# Patient Record
Sex: FEMALE | Race: WHITE | NOT HISPANIC OR LATINO | Employment: UNEMPLOYED | URBAN - METROPOLITAN AREA
[De-identification: names, ages, dates, MRNs, and addresses within clinical notes are randomized per-mention and may not be internally consistent; named-entity substitution may affect disease eponyms.]

---

## 2017-01-03 ENCOUNTER — OFFICE VISIT (OUTPATIENT)
Dept: OBSTETRICS AND GYNECOLOGY | Facility: CLINIC | Age: 27
End: 2017-01-03
Payer: MEDICAID

## 2017-01-03 VITALS
BODY MASS INDEX: 30.47 KG/M2 | SYSTOLIC BLOOD PRESSURE: 118 MMHG | HEIGHT: 66 IN | DIASTOLIC BLOOD PRESSURE: 82 MMHG | WEIGHT: 189.63 LBS

## 2017-01-03 DIAGNOSIS — R87.613 HGSIL (HIGH GRADE SQUAMOUS INTRAEPITHELIAL LESION) ON PAP SMEAR OF CERVIX: Primary | ICD-10-CM

## 2017-01-03 PROCEDURE — 99203 OFFICE O/P NEW LOW 30 MIN: CPT | Mod: PBBFAC,PO | Performed by: OBSTETRICS & GYNECOLOGY

## 2017-01-03 PROCEDURE — 99203 OFFICE O/P NEW LOW 30 MIN: CPT | Mod: S$PBB,,, | Performed by: OBSTETRICS & GYNECOLOGY

## 2017-01-03 PROCEDURE — 99999 PR PBB SHADOW E&M-NEW PATIENT-LVL III: CPT | Mod: PBBFAC,,, | Performed by: OBSTETRICS & GYNECOLOGY

## 2017-01-03 NOTE — MR AVS SNAPSHOT
"    [facility] - OB/ GYN  [address]  [place] [address]  Phone: [phone]  Fax: [phone]                  Deb Saldana   1/3/2017 1:30 PM   Office Visit    Description:  Female : 1990   Provider:  William Crews MD   Department:  [facility] - OB/ GYN           Reason for Visit     Abnormal Pap Smear           Diagnoses this Visit        Comments    HGSIL (high grade squamous intraepithelial lesion) on Pap smear of cervix    -  Primary            To Do List           Future Appointments        Provider Department Dept Phone    2017 9:30 AM William Crews MD [facility] OB/ -[phone]      Goals (5 Years of Data)     None      Follow-Up and Disposition     Return in about 2 weeks (around 2017) for Colposcopy.      [facility]     [facility] On Call Nurse Care Line - 24/7 Assistance  Registered nurses in the [facility] On Call Center provide clinical advisement, health education, appointment booking, and other advisory services.  Call for this free service at [phone].             Medications           Message regarding Medications     Verify the changes and/or additions to your medication regime listed below are the same as discussed with your clinician today.  If any of these changes or additions are incorrect, please notify your healthcare provider.             Verify that the below list of medications is an accurate representation of the medications you are currently taking.  If none reported, the list may be blank. If incorrect, please contact your healthcare provider. Carry this list with you in case of emergency.                Clinical Reference Information           Vital Signs - Last Recorded  Most recent update: 1/3/2017  1:38 PM by Nakia Zaidi MA    BP Ht Wt LMP BMI    118/82 5' 6" (1.676 m) 86 kg (189 lb 9.5 oz) 2016 30.6 kg/m2      Blood Pressure          Most Recent Value    BP  118/82      Allergies as of 1/3/2017     Clindamycin      Immunizations " Administered on Date of Encounter - 1/3/2017     None      [facility] Sign-Up     Activating your [facility] account is as easy as 1-2-3!     1) Visit [email or web address], select Sign Up Now, enter this activation code and your date of birth, then select Next.  [email or web address]  Expires: 2/17/2017  2:26 PM      2) Create a username and password to use when you visit [facility] in the future and select a security question in case you lose your password and select Next.    3) Enter your e-mail address and click Sign Up!    Additional Information  If you have questions, please e-mail [email or web address] or call [phone] to talk to our [facility] staff. Remember, [facility] is NOT to be used for urgent needs. For medical emergencies, dial 911.         Smoking Cessation     If you would like to quit smoking:   You may be eligible for free services if you are a [place] resident and started smoking cigarettes before September 1, 1988.  Call the Smoking Cessation Trust ([employer]) toll free at (340) 732-1318 or (645) 111-5754.   Call 9-351-QUIT-NOW if you do not meet the above criteria.

## 2017-01-03 NOTE — PROGRESS NOTES
Subjective:       Patient ID: Deb Saldana is a 26 y.o. female.    Chief Complaint:  Abnormal Pap Smear      History of Present Illness  HPI  Pt was referred by PCP for evaluation of recent abnormal pap.  Last pap HSIL on 2016.  Pt reports history of abnormal paps with HPV 10 years ago.    GYN & OB History  Patient's last menstrual period was 2016.   Date of Last Pap: No result found    OB History    Para Term  AB SAB TAB Ectopic Multiple Living   4 3   1     3      # Outcome Date GA Lbr Ata/2nd Weight Sex Delivery Anes PTL Lv   4 AB            3 Para            2 Para            1 Para                   Review of Systems  Review of Systems   Constitutional: Negative for activity change, appetite change, fatigue, fever and unexpected weight change.   Respiratory: Negative for shortness of breath.    Cardiovascular: Negative for chest pain.   Gastrointestinal: Negative for abdominal pain, constipation, diarrhea, nausea and vomiting.   Genitourinary: Negative for dyspareunia, menorrhagia, menstrual problem, vaginal bleeding, vaginal discharge, vaginal pain, dysmenorrhea and vaginal odor.   Musculoskeletal: Negative for back pain.   Neurological: Negative for syncope and headaches.           Objective:    Physical Exam:   Constitutional: She is oriented to person, place, and time. She appears well-developed and well-nourished. No distress.                           Neurological: She is alert and oriented to person, place, and time.     Psychiatric: She has a normal mood and affect. Her behavior is normal. Thought content normal.          Assessment:        1. HGSIL (high grade squamous intraepithelial lesion) on Pap smear of cervix             Plan:      HGSIL (high grade squamous intraepithelial lesion) on Pap smear of cervix  -     Pt counseled on pap result, role of HPV, and risk of cervical/vaginal/vulvar cancer.  Evaluation and treatment options were reviewed.  Recommend proceeding  with colposcopy procedure.  Due to high grade abnormality, pt is likely to require excisional procedure after colposcopy.  Pt was counseled on LEEP and CKC.  Plan to await colpo results for final recommendations.    -     Lastly, pt was counseled on link between tobacco use and cervix dysplasia/cancer.  Recommend cessation of smoking.  Pt will think about it.      Return in about 2 weeks (around 1/17/2017) for Colposcopy.     **Total time spent: 25 min. 50 % or more was spent on counseling about diagnosis, treatment options, and coordination of care.

## 2017-01-03 NOTE — LETTER
January 3, 2017      Beatris العراقي MD  [address]  [address]  [facility] [place]  [place] [address]           [facility] - OB/ GYN  [address] [place] [address]  Phone: [phone]  Fax: [phone]          Patient: Deb Saldana   MR Number: [id number]   YOB: 1990   Date of Visit: 1/3/2017       Dear Dr. Beatris العراقي:    Thank you for referring Deb Saldana to me for evaluation. Attached you will find relevant portions of my assessment and plan of care.    If you have questions, please do not hesitate to call me. I look forward to following Deb Saldana along with you.    Sincerely,    William Crews MD    Enclosure  CC:  No Recipients    If you would like to receive this communication electronically, please contact [email or web address] or [phone] to request more information on [facility] access.    For providers and/or their staff who would like to refer a patient to [facility], please contact us through our one-stop-shop provider referral line, [facility], at [phone].    If you feel you have received this communication in error or would no longer like to receive these types of communications, please e-mail [email or web address]

## 2017-01-12 ENCOUNTER — PROCEDURE VISIT (OUTPATIENT)
Dept: OBSTETRICS AND GYNECOLOGY | Facility: CLINIC | Age: 27
End: 2017-01-12
Payer: MEDICAID

## 2017-01-12 VITALS
BODY MASS INDEX: 30.76 KG/M2 | HEIGHT: 66 IN | DIASTOLIC BLOOD PRESSURE: 88 MMHG | SYSTOLIC BLOOD PRESSURE: 120 MMHG | WEIGHT: 191.38 LBS

## 2017-01-12 DIAGNOSIS — R87.613 HGSIL (HIGH GRADE SQUAMOUS INTRAEPITHELIAL LESION) ON PAP SMEAR OF CERVIX: Primary | ICD-10-CM

## 2017-01-12 PROCEDURE — 88305 TISSUE EXAM BY PATHOLOGIST: CPT

## 2017-01-12 PROCEDURE — 81025 URINE PREGNANCY TEST: CPT | Mod: PBBFAC | Performed by: OBSTETRICS & GYNECOLOGY

## 2017-01-12 PROCEDURE — 88305 TISSUE EXAM BY PATHOLOGIST: CPT | Mod: 26,,,

## 2017-01-12 PROCEDURE — 57454 BX/CURETT OF CERVIX W/SCOPE: CPT | Mod: PBBFAC | Performed by: OBSTETRICS & GYNECOLOGY

## 2017-01-12 NOTE — PROCEDURES
Colposcopy-Today  Date/Time: 2017 10:42 AM  Performed by: LUCY REYEZ  Authorized by: LUCY REYEZ.     Consent Done?:  Yes (Written)  Assistants?: No      Colposcopy Site:  Cervix  Position:  Supine  Acrowhite Lesion? Yes    Atypical Vessels: No    Transformation Zone Adequate?: Yes    Biopsy?: Yes         Location:  Cervix ((12 00))  ECC Performed?: Yes    LEEP Performed?: No    Estimated blood loss (cc):  1   Patient tolerated the procedure well with no immediate complications.   Post-operative instructions were provided for the patient.   Patient was discharged and will follow up if any complications occur     COLPOSCOPY:    Deb Saldana is a 26 y.o. female   presents for colposcopy.  Patient's last menstrual period was 2016..  Her most recent pap smear shows high-grade squamous intraepithelial neoplasia  (HGSIL-encompassing moderate and severe dysplasia).      The abnormal test findings were discussed, as well as HPV infection, need for colposcopy and possible biopsies to determine the plan of care, treatments available, the minimal risk of bleeding and infection with colposcopy, and alternatives to colposcopy.  Pt voiced understanding and desires to proceed.      UPT is negative    COLPOSCOPY EXAM:   TIME OUT PERFORMED.     No gross vulvovaginal or cervix lesions noted.  On colpo: no punctation, no abnormal vasculature and mosaicism noted at 12 o'clock    Biopsy was taken at 12 o'clock.  ECC was performed.   SCJ was entirely visualized.    Hemostasis was adequate with application of Monsel's solution.  The speculum was removed.  The patient did tolerate the procedure well.    All collected specimens sent to pathology for histologic analysis.    Post-colposcopy counseling:  The patient was instructed to manage post-colposcopy cramping with NSAIDs or Tylenol, or with a prescription per the medication card.  Avoid intercourse, douching, or tampons in the vagina for at least 2-3  days.  Expect a clumpy blackish discharge due to Monsel's solution application for several days.  Report heavy bleeding, worsening pain or pain that does not respond to above medications, or foul-smelling vaginal discharge. HPV vaccine recommended according to FDA age guidelines.  Importance of follow-up stressed.      Follow up based on colposcopy results.  Impression:  ROLAND I - II.  If confirmed by pathology results, recommend proceeding with excisional procedure.  Pt would be a candidate for LEEP.

## 2017-01-24 ENCOUNTER — TELEPHONE (OUTPATIENT)
Dept: OBSTETRICS AND GYNECOLOGY | Facility: CLINIC | Age: 27
End: 2017-01-24

## 2017-01-24 NOTE — TELEPHONE ENCOUNTER
----- Message from Leyla Macias sent at 1/24/2017 11:34 AM CST -----  Contact: pt  Please call pt at [phone] re her developing a cyst following the biopsy she had and to advise her how to handle.

## 2017-02-06 ENCOUNTER — TELEPHONE (OUTPATIENT)
Dept: OBSTETRICS AND GYNECOLOGY | Facility: CLINIC | Age: 27
End: 2017-02-06

## 2017-02-06 NOTE — TELEPHONE ENCOUNTER
Patient scheduled for LEEP procedure on 02/23 at 10am [facility] location.  Patient voiced understanding.

## 2017-02-06 NOTE — TELEPHONE ENCOUNTER
----- Message from Leyla Macias sent at 2/6/2017  3:31 PM CST -----  Contact: pt  Pt states she has tried multiple times in the last two weeks to get her biopsy results and has left messages but has never been called back.  She left an urgent message this morning and still has not heard back.  The Dr mentioned to her that based on her results she may need another procedure but she is on a time constraint elizabeth needs to know the results and recommended follow up ASAP because she has other things in her life waiting on and pending on whatever f/u is going to be needed.  Please call pt today at  [phone]

## 2017-02-06 NOTE — TELEPHONE ENCOUNTER
I have made multiple calls to her listed number without any answers ([phone]).  Pt did not have any other numbers listed and is not on portal.  I have not received any messages from pt until this afternoon's message.  I returned the call to the alternate number she provided ([phone]) and spoke with pt.  Pt was notified of limited ability to contact her and she stated that she does not have any other numbers where she can be reached.  Reviewed pathology results with pt (ROLAND II with negative ECC).  Recommend proceeding with LEEP as was discussed at her last visit.  Pt voiced understanding.  Will have nurses contact pt to schedule.

## 2017-02-06 NOTE — TELEPHONE ENCOUNTER
----- Message from Brina Chapman sent at 2/6/2017 11:24 AM CST -----  Contact: pt  Pt requests nurse to call her regarding results from 01/12/17. She can be reached at [phone]

## 2017-02-22 ENCOUNTER — TELEPHONE (OUTPATIENT)
Dept: OBSTETRICS AND GYNECOLOGY | Facility: CLINIC | Age: 27
End: 2017-02-22

## 2017-02-22 NOTE — TELEPHONE ENCOUNTER
----- Message from Maxine Foster sent at 2/22/2017 11:56 AM CST -----  would like to come 3/2 at 159 instead...[phone]

## 2017-02-22 NOTE — TELEPHONE ENCOUNTER
----- Message from Raisa Reilly sent at 2/22/2017  9:49 AM CST -----  Contact: pt  Please call pt @ [phone], regarding procedure tomorrow, pt would like to cancel and reschedule.

## 2017-02-22 NOTE — TELEPHONE ENCOUNTER
Patient stated that she is on her cycle and needs to reschedule.  Patient is rescheduled for 03/09 at 10 am.

## 2017-03-02 ENCOUNTER — PROCEDURE VISIT (OUTPATIENT)
Dept: OBSTETRICS AND GYNECOLOGY | Facility: CLINIC | Age: 27
End: 2017-03-02
Payer: MEDICAID

## 2017-03-02 VITALS — HEIGHT: 66 IN

## 2017-03-02 DIAGNOSIS — N87.1 CIN II (CERVICAL INTRAEPITHELIAL NEOPLASIA II): Primary | ICD-10-CM

## 2017-03-02 PROCEDURE — 81025 URINE PREGNANCY TEST: CPT | Mod: PBBFAC | Performed by: OBSTETRICS & GYNECOLOGY

## 2017-03-02 PROCEDURE — 88305 TISSUE EXAM BY PATHOLOGIST: CPT | Mod: 26,,, | Performed by: PATHOLOGY

## 2017-03-02 PROCEDURE — 57505 ENDOCERVICAL CURETTAGE: CPT | Mod: S$PBB,51,, | Performed by: OBSTETRICS & GYNECOLOGY

## 2017-03-02 PROCEDURE — 57522 CONIZATION OF CERVIX: CPT | Mod: PBBFAC | Performed by: OBSTETRICS & GYNECOLOGY

## 2017-03-02 PROCEDURE — 57505 ENDOCERVICAL CURETTAGE: CPT | Mod: PBBFAC | Performed by: OBSTETRICS & GYNECOLOGY

## 2017-03-02 PROCEDURE — 88305 TISSUE EXAM BY PATHOLOGIST: CPT | Performed by: PATHOLOGY

## 2017-03-02 NOTE — MR AVS SNAPSHOT
"    [facility] - OB/ G[facility]  [address]  [place] [address]  Phone: [phone]  Fax: [phone]                  Deb Saldana   3/2/2017 7:45 AM   Procedure visit    Description:  Female : 1990   Provider:  William Crews MD   Department:  [facility] - OB/ GYN           Reason for Visit     Abnormal Pap Smear           Diagnoses this Visit        Comments    ROLAND II (cervical intraepithelial neoplasia II)    -  Primary            To Do List           Future Appointments        Provider Department Dept Phone    3/30/2017 10:30 AM MD [facility] - OB/ -[phone]      Goals (5 Years of Data)     None      Follow-Up and Disposition     Return in about 4 weeks (around 3/30/2017).    Follow-up and Disposition History      [facility]     [facility] Care [facility] -  Assistance  Registered nurses in the [facility] Call Center provide clinical advisement, health education, appointment booking, and other advisory services.  Call for this free service at [phone].             Medications           Message regarding Medications     Verify the changes and/or additions to your medication regime listed below are the same as discussed with your clinician today.  If any of these changes or additions are incorrect, please notify your healthcare provider.             Verify that the below list of medications is an accurate representation of the medications you are currently taking.  If none reported, the list may be blank. If incorrect, please contact your healthcare provider. Carry this list with you in case of emergency.                Clinical Reference Information           Your Vitals Were     Height                   5' 6" (1.676 m)           Allergies as of 3/2/2017     Clindamycin      Immunizations Administered on Date of Encounter - 3/2/2017     None      Orders Placed During Today's Visit      Normal Orders This Visit    Leep-gyn     POCT urine " pregnancy     Tissue Specimen To Pathology, Obstetrics/Gynecology     Tissue Specimen To Pathology, Obstetrics/Gynecology     Tissue Specimen To Pathology, Obstetrics/Gynecology       [facility] Sign-Up     Activating your [facility] account is as easy as 1-2-3!     1) Visit [email or web address], select Sign Up Now, enter this activation code and your date of birth, then select Next.  [email or web address]  Expires: 4/16/2017  9:06 AM      2) Create a username and password to use when you visit [facility] in the future and select a security question in case you lose your password and select Next.    3) Enter your e-mail address and click Sign Up!    Additional Information  If you have questions, please e-mail [email or web address] or call [phone] to talk to our [facility] staff. Remember, [facility] is NOT to be used for urgent needs. For medical emergencies, dial 911.         Smoking Cessation     If you would like to quit smoking:   You may be eligible for free services if you are a [place] resident and started smoking cigarettes before September 1, 1988.  Call the Smoking Cessation Trust ([facility]) toll free at (684) 886-1301 or (884) 406-5833.   Call 5-611-QUIT-NOW if you do not meet the above criteria.            Language Assistance Services     ATTENTION: Language assistance services are available, free of charge. Please call [phone].      ATENCIÓN: Si habla español, tiene a del angel disposición servicios gratuitos de asistencia lingüística. Llame al [phone].     CHÚ Ý: N?u b?n nói Ti?ng Vi?t, có các d?ch v? h? tr? ngôn ng? mi?n phí dành cho b?n. G?i s? [phone].         [facility] complies with applicable Federal civil rights laws and does not discriminate on the basis of race, color, national origin, age, disability, or sex.

## 2017-03-02 NOTE — PROCEDURES
Leep-gyn  Date/Time: 3/2/2017 9:04 AM  Performed by: LUCY REYEZ  Authorized by: LUCY REYEZ   Preparation: Patient was prepped and draped in the usual sterile fashion.  Local anesthesia used: yes  Anesthesia: local infiltration    Anesthesia:  Local anesthesia used: yes  Anesthesia: local infiltration  Local Anesthetic: lidocaine 1% with epinephrine   Anesthetic total: 10 mL  Sedation:  Patient sedated: no    Patient tolerance: Patient tolerated the procedure well with no immediate complications  Comments: Deb Saldana is a 26 y.o. female  presents for a LEEP procedure.      DATA REVIEWED:  Last Pap Result: HSIL  Last Colposcopy Result: ROLAND II negative ECC     PRE- LEEP PROCEDURE COUNSELING:  Risks of infection, bleeding, pain, injury to adjacent organs as well as future cervical incompetence.  That this procedure does not guarantee to completely remove all abnormal cells and that dysplasia may reoccur in the future.  Alternatives to the LEEP procedure were discussed and the patient agrees to proceed..    PROCEDURE:  TIME OUT PERFORMED.  The cervix and transformation zone were visualized with a speculum and a local block was obtained with 10 mL 1% Lidocaine with epinephrine.  A  loop was selected.  The entire transformation zone was excised in two parts due to lesion size (anterior and posterior).  Post-LEEP ECC was performed.  The base was cauterized with a ball cautery.  Monsels solution was applied to the base to obtain hemostasis and the speculum removed.  The specimens were placed in formalin and sent to Pathology for a Histopathologic diagnosis.    POST LEEP PROCEDURE COUNSELING:  Manage post LEEP cramping with NSAIDs or Tylenol.  Avoid anything in vagina (intercourse, douching, tampons) two weeks after the Procedure.  Expect a watery grey to yellow vaginal discharge for several weeks.  Limit activity for 2 weeks.  Report bleeding heavier than a period, worsening pain, fever >  101.0 F, or foul-smelling vaginal discharge.  Importance of follow-up stressed.    Counseling lasted approximately 15 minutes and all her questions were answered.    FOLLOW-UP: in 4 weeks

## 2017-03-13 ENCOUNTER — TELEPHONE (OUTPATIENT)
Dept: OBSTETRICS AND GYNECOLOGY | Facility: CLINIC | Age: 27
End: 2017-03-13

## 2017-03-13 NOTE — TELEPHONE ENCOUNTER
Called and spoke with pt.  Reviewed pathology results with pt: anterior ROLAND 2-3 with positive margins; posterior ROLAND 2 with positive margins; neg ECC.  Recommend follow up paps every 4-6 months.  Pt voiced understanding and questions were answered.

## 2017-03-13 NOTE — TELEPHONE ENCOUNTER
Called pt but she was not home.  Left message to return call with mother.  Reason for call: discuss LEEP pathology results (anterior ROLAND 2-3 with positive margins; posterior ROLAND 2 with positive margins; neg ECC).

## 2017-03-30 ENCOUNTER — OFFICE VISIT (OUTPATIENT)
Dept: OBSTETRICS AND GYNECOLOGY | Facility: CLINIC | Age: 27
End: 2017-03-30
Payer: MEDICAID

## 2017-03-30 VITALS
BODY MASS INDEX: 30.44 KG/M2 | HEIGHT: 66 IN | SYSTOLIC BLOOD PRESSURE: 110 MMHG | WEIGHT: 189.38 LBS | DIASTOLIC BLOOD PRESSURE: 78 MMHG

## 2017-03-30 DIAGNOSIS — Z98.890 STATUS POST LEEP (LOOP ELECTROSURGICAL EXCISION PROCEDURE) OF CERVIX: Primary | ICD-10-CM

## 2017-03-30 PROCEDURE — 99024 POSTOP FOLLOW-UP VISIT: CPT | Mod: S$PBB,,, | Performed by: OBSTETRICS & GYNECOLOGY

## 2017-03-30 PROCEDURE — 99212 OFFICE O/P EST SF 10 MIN: CPT | Mod: PBBFAC | Performed by: OBSTETRICS & GYNECOLOGY

## 2017-03-30 PROCEDURE — 99999 PR PBB SHADOW E&M-EST. PATIENT-LVL II: CPT | Mod: PBBFAC,,, | Performed by: OBSTETRICS & GYNECOLOGY

## 2017-03-30 NOTE — PROGRESS NOTES
Subjective:       Patient ID: Deb Saldana is a 26 y.o. female.    Chief Complaint:  Post-op Evaluation (LEEP on 3/2/17)      History of Present Illness  HPI  Pt is s/p LEEP on 3/2/17 and is here for scheduled follow up visit.  Pt reports complaints of vulvar irritation since LEEP (itching).  Otherwise doing well.    GYN & OB History  Patient's last menstrual period was 2017 (within days).   Date of Last Pap: No result found    OB History    Para Term  AB SAB TAB Ectopic Multiple Living   4 3   1     3      # Outcome Date GA Lbr Ata/2nd Weight Sex Delivery Anes PTL Lv   4 AB            3 Para            2 Para            1 Para                   Review of Systems  Review of Systems   Constitutional: Negative for activity change, appetite change and fatigue.   Respiratory: Negative for shortness of breath.    Cardiovascular: Negative for chest pain.   Gastrointestinal: Negative for abdominal pain, constipation, diarrhea, nausea and vomiting.   Genitourinary: Positive for genital sores. Negative for dysuria, hematuria, pelvic pain, vaginal bleeding, vaginal discharge, vaginal pain and vaginal odor.   Neurological: Negative for syncope and headaches.           Objective:    Physical Exam:   Constitutional: She is oriented to person, place, and time. She appears well-developed and well-nourished. No distress.             Abdominal: Soft. Bowel sounds are normal. She exhibits no distension. There is no tenderness.     Genitourinary: Vagina normal and uterus normal.       Pelvic exam was performed with patient supine. There is rash on the right labia. There is no tenderness, lesion or injury on the right labia. There is rash on the left labia. There is no tenderness, lesion or injury on the left labia. Uterus is not deviated, not enlarged and not tender. Cervix is normal. Right adnexum displays no mass, no tenderness and no fullness. Left adnexum displays no mass, no tenderness and no  fullness. No erythema, tenderness or bleeding in the vagina. No foreign body in the vagina. No signs of injury around the vagina. No vaginal discharge found.     Cervix exhibits no motion tenderness, no discharge and no friability.   Genitourinary Comments: UA today negative               Neurological: She is alert and oriented to person, place, and time.    Skin: Skin is warm and dry.    Psychiatric: She has a normal mood and affect. Her behavior is normal.          Assessment:        1. Status post LEEP (loop electrosurgical excision procedure) of cervix             Plan:      Status post LEEP (loop electrosurgical excision procedure) of cervix  -    Wound healed well.  Pt again counseled on pathology results (anterior ROLAND 2-3 with positive margins; posterior ROLAND 2 with positive margins; neg ECC).  Recommend paps every 4-6 months.  -     Area of external vulvar findings likely secondary to irritation from scratching as this area was not present at prior examinations.  Pt advised to use OTC cortisone cream x 1 week and monitor progress of symptoms.  If symptoms persist, would recommend vulvar biopsy as pt has several risk factors for dysplasia (smoker, history of genital warts, history of cervix dysplasia).  Pt voiced understanding.    Return in about 4 months (around 7/30/2017) for Pap.